# Patient Record
Sex: FEMALE | URBAN - METROPOLITAN AREA
[De-identification: names, ages, dates, MRNs, and addresses within clinical notes are randomized per-mention and may not be internally consistent; named-entity substitution may affect disease eponyms.]

---

## 2021-05-21 ENCOUNTER — APPOINTMENT (RX ONLY)
Dept: URBAN - METROPOLITAN AREA CLINIC 55 | Facility: CLINIC | Age: 31
Setting detail: DERMATOLOGY
End: 2021-05-21

## 2021-05-21 DIAGNOSIS — Z41.9 ENCOUNTER FOR PROCEDURE FOR PURPOSES OTHER THAN REMEDYING HEALTH STATE, UNSPECIFIED: ICD-10-CM

## 2021-05-21 PROCEDURE — ? PALOMAR VECTUS LASER HAIR REMOVAL

## 2021-05-21 PROCEDURE — ? COSMETIC CONSULTATION: GENERAL

## 2021-05-21 NOTE — PROCEDURE: PALOMAR VECTUS LASER HAIR REMOVAL
Post-Care Instructions: I reviewed with the patient in detail post-care instructions. Patient should avoid sun for a minimum of 4 weeks before and after treatment.
External Cooling Fan Speed: 0
Laser Type: Alexandrite 755nm
Fluence In J/Cm2: 10
Optic Size: 23 x 38mm
Render Post-Care In The Note: No
Post-Procedure Care: Immediate endpoint: perifollicular erythema and edema. Post care was reviewed with the patient and all patient questions were answered to their satisfaction.
Rate (Hz): Medium
Pre-Procedure: Prior to proceeding the treatment areas were cleaned and all present put on their eye protection.
Detail Level: Detailed
Consent: Verbal consent obtained, risks reviewed including but not limited to crusting, scabbing, blistering, scarring, darker or lighter pigmentary change, paradoxical hair regrowth, incomplete removal of hair and infection.

## 2021-06-18 ENCOUNTER — APPOINTMENT (RX ONLY)
Dept: URBAN - METROPOLITAN AREA CLINIC 55 | Facility: CLINIC | Age: 31
Setting detail: DERMATOLOGY
End: 2021-06-18

## 2021-06-18 DIAGNOSIS — Z41.9 ENCOUNTER FOR PROCEDURE FOR PURPOSES OTHER THAN REMEDYING HEALTH STATE, UNSPECIFIED: ICD-10-CM

## 2021-06-18 PROCEDURE — ? PALOMAR VECTUS LASER HAIR REMOVAL

## 2021-06-18 NOTE — PROCEDURE: PALOMAR VECTUS LASER HAIR REMOVAL
Detail Level: Detailed
Treatment Number: 0
Render Post-Care In The Note: No
Laser Type: Alexandrite 755nm
Rate (Hz): Medium
Optic Size: 23 x 38mm
Post-Care Instructions: I reviewed with the patient in detail post-care instructions. Patient should avoid sun for a minimum of 4 weeks before and after treatment.
Fluence In J/Cm2: 10
Post-Procedure Care: Immediate endpoint: perifollicular erythema and edema. Post care was reviewed with the patient and all patient questions were answered to their satisfaction.
Pre-Procedure: Prior to proceeding the treatment areas were cleaned and all present put on their eye protection.
Consent: Verbal consent obtained, risks reviewed including but not limited to crusting, scabbing, blistering, scarring, darker or lighter pigmentary change, paradoxical hair regrowth, incomplete removal of hair and infection.

## 2021-06-19 ENCOUNTER — APPOINTMENT (RX ONLY)
Dept: URBAN - METROPOLITAN AREA CLINIC 55 | Facility: CLINIC | Age: 31
Setting detail: DERMATOLOGY
End: 2021-06-19

## 2021-06-22 ENCOUNTER — APPOINTMENT (RX ONLY)
Dept: URBAN - METROPOLITAN AREA CLINIC 55 | Facility: CLINIC | Age: 31
Setting detail: DERMATOLOGY
End: 2021-06-22

## 2021-06-22 DIAGNOSIS — Z41.9 ENCOUNTER FOR PROCEDURE FOR PURPOSES OTHER THAN REMEDYING HEALTH STATE, UNSPECIFIED: ICD-10-CM

## 2021-06-22 PROCEDURE — ? BOTOX

## 2021-06-22 NOTE — PROCEDURE: BOTOX
Show Additional Area 3: Yes
[facility] Units: 0
Lot #: [id number]
Additional Area 3 Location: Chin
Consent: Written consent obtained. Risks include but not limited to lid/brow ptosis, bruising, swelling, diplopia, temporary effect, incomplete chemical denervation.
Show Lcl Units: No
Additional Area 1 Location: Lateral Brows
Dilution (U/0.1 Cc): 4
Expiration Date (Month Year): 10/2023
Additional Area 2 Location: upper lip
Forehead Units: 3
Post-Care Instructions: Patient instructed to not lie down for 4 hours and limit physical activity for 24 hours.
Detail Level: Detailed

## 2022-01-05 ENCOUNTER — APPOINTMENT (RX ONLY)
Dept: URBAN - METROPOLITAN AREA CLINIC 55 | Facility: CLINIC | Age: 32
Setting detail: DERMATOLOGY
End: 2022-01-05

## 2022-01-05 DIAGNOSIS — Z41.9 ENCOUNTER FOR PROCEDURE FOR PURPOSES OTHER THAN REMEDYING HEALTH STATE, UNSPECIFIED: ICD-10-CM

## 2022-01-05 PROCEDURE — ? DIAGNOSIS COMMENT

## 2022-01-05 PROCEDURE — ? BOTOX

## 2022-01-05 NOTE — PROCEDURE: DIAGNOSIS COMMENT
Comment: Patient reports that she had Botox injections done one and a half months ago at [facility]. Patient states that her injector had recommended a higher dose than what she has been receiving here for the past few years. The injector also recommended a different injection pattern. Per the patient, Botox was injected between her brows and under her lateral brow tails. Patient reports excessive heaviness to medial brows; unevenness and brow “drop” can be visualized. Even though heaviness is present, lots of movement can be seen to the bilateral corrugators, which leads this RN to believe the lower aspect of the frontalis was injected with neurotoxins and not the corrugators. Patient is not interested in ever returning to the other location for follow up or augmentation. She would like her normal dosing today, but she is aware the heaviness may take 4-6 more weeks to dissipate; verbalized understanding.
Render Risk Assessment In Note?: no
Detail Level: Simple

## 2022-01-05 NOTE — PROCEDURE: BOTOX
Show Ucl Units: No
Additional Area 4 Units: 0
Post-Care Instructions: Patient instructed to not lie down for 4 hours and limit physical activity for 24 hours.
Show Orbicularis Oculi Units: Yes
Glabellar Complex Units: 4
Expiration Date (Month Year): 3/2024
Additional Area 2 Location: Upper lip
Forehead Units: 3
Additional Area 4 Location: upper cutaneous lip
Detail Level: Detailed
Lot #: [id number]
Consent: Written consent obtained. Risks include but not limited to lid/brow ptosis, bruising, swelling, diplopia, temporary effect, incomplete chemical denervation.
Additional Area 3 Location: Chin
Additional Area 1 Location: Left Lateral Brow

## 2022-07-07 ENCOUNTER — APPOINTMENT (RX ONLY)
Dept: URBAN - METROPOLITAN AREA CLINIC 55 | Facility: CLINIC | Age: 32
Setting detail: DERMATOLOGY
End: 2022-07-07

## 2022-07-07 DIAGNOSIS — Z41.9 ENCOUNTER FOR PROCEDURE FOR PURPOSES OTHER THAN REMEDYING HEALTH STATE, UNSPECIFIED: ICD-10-CM

## 2022-07-07 PROCEDURE — ? BOTOX

## 2022-07-07 NOTE — PROCEDURE: BOTOX
Show Forehead Units: Yes
Additional Area 5 Units: 0
Consent: Written consent obtained. Risks include but not limited to lid/brow ptosis, bruising, swelling, diplopia, temporary effect, incomplete chemical denervation.
Dilution (U/0.1 Cc): 4
Post-Care Instructions: Patient instructed to not lie down for 4 hours and limit physical activity for 24 hours.
Additional Area 3 Location: Chin
Additional Area 1 Location: Right Lateral Brow
Show Right And Left Pupillary Line Units: No
Detail Level: Detailed
Lot #: [id number]
Additional Area 2 Location: Upper lip
Additional Area 5 Location: lower eye lids
Expiration Date (Month Year): 4/2024
Forehead Units: 3
Glabellar Complex Units: 6

## 2022-07-20 ENCOUNTER — APPOINTMENT (RX ONLY)
Dept: URBAN - METROPOLITAN AREA CLINIC 55 | Facility: CLINIC | Age: 32
Setting detail: DERMATOLOGY
End: 2022-07-20

## 2022-07-20 DIAGNOSIS — Z41.9 ENCOUNTER FOR PROCEDURE FOR PURPOSES OTHER THAN REMEDYING HEALTH STATE, UNSPECIFIED: ICD-10-CM

## 2022-07-20 PROCEDURE — ? BOTOX

## 2022-07-20 PROCEDURE — ? DIAGNOSIS COMMENT

## 2022-07-20 NOTE — PROCEDURE: DIAGNOSIS COMMENT
Render Risk Assessment In Note?: no
Detail Level: Simple
Comment: Spocking to right brow, but patient prefers that look. 1 unit of Botox to left lateral brow for slight lift for even resting brows.

## 2022-07-20 NOTE — PROCEDURE: BOTOX
Depressor Anguli Oris Units: 0
Show Additional Area 2: Yes
Additional Area 2 Location: Upper lip
Additional Area 6 Location: Left lateral brow
Expiration Date (Month Year): 4/2024
Lot #: [id number]
Show Right And Left Brow Units: No
Dilution (U/0.1 Cc): 4
Additional Area 3 Location: Chin
Post-Care Instructions: Patient instructed to not lie down for 4 hours and limit physical activity for 24 hours.
Additional Area 5 Location: lower eye lids
Additional Area 6 Units: 1
Additional Area 1 Location: Right Lateral Brow
Consent: Written consent obtained. Risks include but not limited to lid/brow ptosis, bruising, swelling, diplopia, temporary effect, incomplete chemical denervation.
Detail Level: Detailed

## 2022-09-30 ENCOUNTER — APPOINTMENT (RX ONLY)
Dept: URBAN - METROPOLITAN AREA CLINIC 55 | Facility: CLINIC | Age: 32
Setting detail: DERMATOLOGY
End: 2022-09-30

## 2022-09-30 DIAGNOSIS — Z41.9 ENCOUNTER FOR PROCEDURE FOR PURPOSES OTHER THAN REMEDYING HEALTH STATE, UNSPECIFIED: ICD-10-CM

## 2022-09-30 PROCEDURE — ? BOTOX

## 2022-09-30 NOTE — PROCEDURE: BOTOX
Show Mentalis Units: No
Additional Area 1 Location: upper lip
R Brow Units: 0
Show Inventory Tab: Show
Show Additional Area 1: Yes
Forehead Units: 3
Detail Level: Detailed
Post-Care Instructions: Patient instructed to not lie down for 4 hours and limit physical activity for 24 hours.
Consent: Verbal consent obtained. Risks include but not limited to lid/brow ptosis, bruising, swelling, diplopia, temporary effect, incomplete chemical denervation.
Additional Area 2 Location: chin
Dilution (U/0.1 Cc): 4
Glabellar Complex Units: 6

## 2023-01-13 ENCOUNTER — APPOINTMENT (RX ONLY)
Dept: URBAN - METROPOLITAN AREA CLINIC 55 | Facility: CLINIC | Age: 33
Setting detail: DERMATOLOGY
End: 2023-01-13

## 2023-01-13 DIAGNOSIS — Z41.9 ENCOUNTER FOR PROCEDURE FOR PURPOSES OTHER THAN REMEDYING HEALTH STATE, UNSPECIFIED: ICD-10-CM

## 2023-01-13 PROCEDURE — ? BOTOX

## 2023-01-13 NOTE — PROCEDURE: BOTOX
Glabellar Complex Units: 6
R Brow Units: 0
Show Right And Left Brow Units: No
Show Depressor Anguli Units: Yes
Additional Area 2 Location: chin
Show Inventory Tab: Show
Consent: Verbal consent obtained. Risks include but not limited to lid/brow ptosis, bruising, swelling, diplopia, temporary effect, incomplete chemical denervation.
Post-Care Instructions: Patient instructed to not lie down for 4 hours and limit physical activity for 24 hours.
Dilution (U/0.1 Cc): 4
Detail Level: Detailed
Forehead Units: 3
Additional Area 1 Location: upper lip

## 2023-01-19 ENCOUNTER — APPOINTMENT (RX ONLY)
Dept: URBAN - METROPOLITAN AREA CLINIC 55 | Facility: CLINIC | Age: 33
Setting detail: DERMATOLOGY
End: 2023-01-19

## 2023-01-19 DIAGNOSIS — Z41.9 ENCOUNTER FOR PROCEDURE FOR PURPOSES OTHER THAN REMEDYING HEALTH STATE, UNSPECIFIED: ICD-10-CM

## 2023-01-19 PROCEDURE — ? INVENTORY

## 2023-01-19 PROCEDURE — ? HYDRAFACIAL

## 2023-01-19 ASSESSMENT — LOCATION SIMPLE DESCRIPTION DERM: LOCATION SIMPLE: GLABELLA

## 2023-01-19 ASSESSMENT — LOCATION ZONE DERM: LOCATION ZONE: FACE

## 2023-01-19 ASSESSMENT — LOCATION DETAILED DESCRIPTION DERM: LOCATION DETAILED: GLABELLA

## 2023-01-19 NOTE — PROCEDURE: HYDRAFACIAL
Number Of Passes Step 3: 0
Tip Override
Treatment Number: 1
Comments: [facility] [employer]. Blue tip, peel prep,   Vit c booster, 10 min red led
Solution Override
Indication: skin texture
Location: face
Consent: Written consent obtained, risks reviewed including but not limited to crusting, scabbing, blistering, scarring, darker or lighter pigmentary change, bruising, and/or incomplete response.
Post-Care Instructions: I reviewed with the patient in detail post-care instructions. Patient should stay away from the sun and wear sun protection until treated areas are fully healed.

## 2023-01-27 ENCOUNTER — APPOINTMENT (RX ONLY)
Dept: URBAN - METROPOLITAN AREA CLINIC 55 | Facility: CLINIC | Age: 33
Setting detail: DERMATOLOGY
End: 2023-01-27

## 2023-01-27 DIAGNOSIS — Z41.9 ENCOUNTER FOR PROCEDURE FOR PURPOSES OTHER THAN REMEDYING HEALTH STATE, UNSPECIFIED: ICD-10-CM

## 2023-01-27 PROCEDURE — ? BOTOX

## 2023-01-27 PROCEDURE — ? INVENTORY

## 2023-01-27 PROCEDURE — ? DIAGNOSIS COMMENT

## 2023-01-27 PROCEDURE — ? IN-HOUSE DISPENSING PHARMACY

## 2023-01-27 NOTE — PROCEDURE: DIAGNOSIS COMMENT
Render Risk Assessment In Note?: no
Detail Level: Simple
Comment: 2 units at NC to left lateral  for an undulating appearance.

## 2023-01-27 NOTE — PROCEDURE: BOTOX
Nasal Root Units: 0
Show Periorbital Units: Yes
Show Ucl Units: No
Consent: Verbal consent obtained. Risks include but not limited to lid/brow ptosis, bruising, swelling, diplopia, temporary effect, incomplete chemical denervation.
Additional Area 1 Location: upper lip
Incrementing Botox Units: By 0.5 Units
Post-Care Instructions: Patient instructed to not lie down for 4 hours and limit physical activity for 24 hours.
Dilution (U/0.1 Cc): 4
Detail Level: Detailed
Glabellar Complex Units: 2
Show Inventory Tab: Show
Additional Area 2 Location: chin

## 2023-01-27 NOTE — PROCEDURE: IN-HOUSE DISPENSING PHARMACY
Product 52 Unit Type: mg
Product 43 Price/Unit (In Dollars): 0
Name Of Product 1: Anti-Aging Brightening Cream
Product 5 Refills: 11
Product 5 Units Dispensed: 1
Product 3 Unit Type: ml
Product 1 Application Directions: Apply nightly or as directed. Use SPF daily.
Name Of Product 6: Rosacea Triple Combination Gel
Product 7 Amount/Unit (Numbers Only): 30
Render Product Pricing In Note: Yes
Product 7 Application Directions: Apply only as directed
Name Of Product 2: Dapsone / Spironolactone Gel
Product 2 Application Directions: Apply nightly or as directed.
Name Of Product 4: Hydroquinone 8% Emulsion
Name Of Product 7: Lidocaine 23% / Tetracaine 7% Cream
Product 4 Refills: 2
Name Of Product 5: Hydrating Tretinoin 0.025% Cream
Name Of Product 3: Acne Triple Combination Gel
Send Charges To Patient Encounter: No
Detail Level: Zone

## 2023-04-18 ENCOUNTER — APPOINTMENT (RX ONLY)
Dept: URBAN - METROPOLITAN AREA CLINIC 55 | Facility: CLINIC | Age: 33
Setting detail: DERMATOLOGY
End: 2023-04-18

## 2023-04-18 DIAGNOSIS — Z41.9 ENCOUNTER FOR PROCEDURE FOR PURPOSES OTHER THAN REMEDYING HEALTH STATE, UNSPECIFIED: ICD-10-CM

## 2023-04-18 PROCEDURE — ? BOTOX

## 2023-04-18 NOTE — PROCEDURE: BOTOX
Lateral Platysmal Bands Units: 0
Show Topical Anesthesia: Yes
Additional Area 2 Location: chin
Show Mentalis Units: No
Forehead Units: 3
Glabellar Complex Units: 8
Additional Area 1 Location: upper lip
Detail Level: Detailed
Show Inventory Tab: Show
Consent: Verbal consent obtained. Risks include but not limited to lid/brow ptosis, bruising, swelling, diplopia, temporary effect, incomplete chemical denervation.
Dilution (U/0.1 Cc): 4
Incrementing Botox Units: By 0.5 Units
Post-Care Instructions: Patient instructed to not lie down for 4 hours and limit physical activity for 24 hours.

## 2023-05-09 ENCOUNTER — APPOINTMENT (RX ONLY)
Dept: URBAN - METROPOLITAN AREA CLINIC 55 | Facility: CLINIC | Age: 33
Setting detail: DERMATOLOGY
End: 2023-05-09

## 2023-05-09 DIAGNOSIS — Z41.9 ENCOUNTER FOR PROCEDURE FOR PURPOSES OTHER THAN REMEDYING HEALTH STATE, UNSPECIFIED: ICD-10-CM

## 2023-05-09 PROCEDURE — ? DIAGNOSIS COMMENT

## 2023-05-09 PROCEDURE — ? INVENTORY

## 2023-05-09 PROCEDURE — ? BOTOX

## 2023-05-09 NOTE — PROCEDURE: DIAGNOSIS COMMENT
Render Risk Assessment In Note?: no
Detail Level: Simple
Comment: 2 units to right lateral  for unwanted flexion (area not previously treated).

## 2023-05-09 NOTE — PROCEDURE: BOTOX
Additional Area 2 Location: chin
Nasal Root Units: 0
Show Additional Area 6: Yes
Forehead Units: 2
Show Right And Left Pupillary Line Units: No
Show Inventory Tab: Show
Additional Area 1 Location: upper lip
Consent: Verbal consent obtained. Risks include but not limited to lid/brow ptosis, bruising, swelling, diplopia, temporary effect, incomplete chemical denervation.
Dilution (U/0.1 Cc): 4
Post-Care Instructions: Patient instructed to not lie down for 4 hours and limit physical activity for 24 hours.
Additional Area 3 Location: lower lip
Incrementing Botox Units: By 0.5 Units
Detail Level: Detailed

## 2023-08-28 ENCOUNTER — APPOINTMENT (RX ONLY)
Dept: URBAN - METROPOLITAN AREA CLINIC 55 | Facility: CLINIC | Age: 33
Setting detail: DERMATOLOGY
End: 2023-08-28

## 2023-08-28 DIAGNOSIS — Z41.9 ENCOUNTER FOR PROCEDURE FOR PURPOSES OTHER THAN REMEDYING HEALTH STATE, UNSPECIFIED: ICD-10-CM

## 2023-08-28 PROCEDURE — ? IN-HOUSE DISPENSING PHARMACY

## 2023-08-28 PROCEDURE — ? INVENTORY

## 2023-08-28 NOTE — PROCEDURE: IN-HOUSE DISPENSING PHARMACY
Product 7 Units Dispensed: 0
Product 33 Unit Type: mg
Product 2 Amount/Unit (Numbers Only): 30
Product 7 Unit Type: ml
Product 5 Units Dispensed: 1
Product 7 Refills: 2
Name Of Product 4: Hydroquinone 8% Emulsion
Name Of Product 5: Hydrating Tretinoin 0.025% Cream
Name Of Product 7: Lidocaine 23% / Tetracaine 7% Cream
Detail Level: Zone
Name Of Product 1: Anti-Aging Brightening Cream
Product 6 Application Directions: Apply nightly or as directed. Use SPF daily.
Render Product Pricing In Note: Yes
Product 4 Application Directions: Apply nightly or as directed.
Name Of Product 6: Rosacea Triple Combination Gel
Name Of Product 2: Dapsone / Spironolactone Gel
Product 3 Refills: 11
Product 7 Application Directions: Apply only as directed
Product 5 Refills: 10
Send Charges To Patient Encounter: No
Name Of Product 3: Acne Triple Combination Gel

## 2024-04-02 ENCOUNTER — APPOINTMENT (RX ONLY)
Dept: URBAN - METROPOLITAN AREA CLINIC 55 | Facility: CLINIC | Age: 34
Setting detail: DERMATOLOGY
End: 2024-04-02

## 2024-04-02 DIAGNOSIS — Z41.9 ENCOUNTER FOR PROCEDURE FOR PURPOSES OTHER THAN REMEDYING HEALTH STATE, UNSPECIFIED: ICD-10-CM

## 2024-04-02 PROCEDURE — ? BOTOX

## 2024-04-02 NOTE — PROCEDURE: BOTOX
Right Pupillary Line Units: 0
Show [place] Units: Yes
Show Right And Left Pupillary Line Units: No
Additional Area 3 Location: lower lip
Dilution (U/0.1 Cc): 4
Detail Level: Detailed
Additional Area 2 Location: chin
Incrementing Botox Units: By 0.5 Units
Consent: Verbal consent obtained. Risks include but not limited to lid/brow ptosis, bruising, swelling, diplopia, temporary effect, incomplete chemical denervation.
Additional Area 1 Location: upper lip
Show Inventory Tab: Show
Post-Care Instructions: Patient instructed to not lie down for 4 hours and limit physical activity for 24 hours.
Forehead Units: 3
Glabellar Complex Units: 10

## 2024-04-09 ENCOUNTER — APPOINTMENT (RX ONLY)
Dept: URBAN - METROPOLITAN AREA CLINIC 55 | Facility: CLINIC | Age: 34
Setting detail: DERMATOLOGY
End: 2024-04-09

## 2024-04-09 DIAGNOSIS — Z41.9 ENCOUNTER FOR PROCEDURE FOR PURPOSES OTHER THAN REMEDYING HEALTH STATE, UNSPECIFIED: ICD-10-CM

## 2024-04-09 PROCEDURE — ? INVENTORY

## 2024-04-09 PROCEDURE — ? BOTOX

## 2024-04-09 PROCEDURE — ? DIAGNOSIS COMMENT

## 2024-04-09 NOTE — PROCEDURE: BOTOX
Show Additional Area 6: Yes
Show Ucl Units: No
Left Pupillary Line Units: 0
Consent: Verbal consent obtained. Risks include but not limited to lid/brow ptosis, bruising, swelling, diplopia, temporary effect, incomplete chemical denervation.
Dilution (U/0.1 Cc): 4
Post-Care Instructions: Patient instructed to not lie down for 4 hours and limit physical activity for 24 hours.
Additional Area 2 Location: chin
Incrementing Botox Units: By 0.5 Units
Detail Level: Detailed
Additional Area 1 Location: upper lip
Show Inventory Tab: Show
Additional Area 3 Location: lower lip

## 2024-04-09 NOTE — PROCEDURE: DIAGNOSIS COMMENT
Detail Level: Simple
Render Risk Assessment In Note?: no
Comment: Patient reports medial brow heaviness after last Botox injection. 4 units at NC to lateral brow to help lift. Will avoid lateral  injection at next visit.

## 2024-04-17 ENCOUNTER — APPOINTMENT (RX ONLY)
Dept: URBAN - METROPOLITAN AREA CLINIC 55 | Facility: CLINIC | Age: 34
Setting detail: DERMATOLOGY
End: 2024-04-17

## 2024-04-17 DIAGNOSIS — Z41.9 ENCOUNTER FOR PROCEDURE FOR PURPOSES OTHER THAN REMEDYING HEALTH STATE, UNSPECIFIED: ICD-10-CM

## 2024-04-17 PROCEDURE — ? INVENTORY

## 2024-04-17 PROCEDURE — ? IN-HOUSE DISPENSING PHARMACY

## 2024-04-17 NOTE — PROCEDURE: IN-HOUSE DISPENSING PHARMACY
Product 80 Refills: 0
Product 4 Refills: 2
Product 76 Unit Type: mg
Render Product Pricing In Note: Yes
Product 6 Refills: 11
Product 6 Amount/Unit (Numbers Only): 30
Product 2 Unit Type: ml
Product 1 Application Directions: Apply nightly or as directed. Use SPF daily.
Product 7 Application Directions: Apply only as directed
Name Of Product 4: Hydroquinone 8% Emulsion
Name Of Product 5: Hydrating Tretinoin 0.025% Cream
Name Of Product 1: Anti-Aging Brightening Cream
Name Of Product 3: Acne Triple Combination Gel
Name Of Product 2: Dapsone / Spironolactone Gel
Name Of Product 6: Rosacea Triple Combination Gel
Send Charges To Patient Encounter: No
Product 4 Application Directions: Apply nightly or as directed.
Detail Level: Zone
Name Of Product 7: Lidocaine 23% / Tetracaine 7% Cream
Product 5 Units Dispensed: 1

## 2024-12-20 ENCOUNTER — RX ONLY (RX ONLY)
Age: 34
End: 2024-12-20

## 2024-12-20 RX ORDER — TRETINOIN/HYALURONATE/NIACIN 0.025-0.5%
CREAM (GRAM) TOPICAL
Qty: 30 | Refills: 9 | Status: ERX | COMMUNITY
Start: 2024-12-20

## 2025-03-26 ENCOUNTER — APPOINTMENT (OUTPATIENT)
Dept: URBAN - METROPOLITAN AREA CLINIC 55 | Facility: CLINIC | Age: 35
Setting detail: DERMATOLOGY
End: 2025-03-26

## 2025-03-26 DIAGNOSIS — Z41.9 ENCOUNTER FOR PROCEDURE FOR PURPOSES OTHER THAN REMEDYING HEALTH STATE, UNSPECIFIED: ICD-10-CM

## 2025-03-26 PROCEDURE — ? BOTOX

## 2025-03-26 PROCEDURE — ? INVENTORY

## 2025-03-26 NOTE — PROCEDURE: BOTOX
Show Additional Area 3: Yes
Anterior Platysmal Bands Units: 0
Show Mentalis Units: No
Additional Area 3 Location: gummy smile
Glabellar Complex Units: 6
Forehead Units: 3
Show Inventory Tab: Hide
Additional Area 1 Location: Lip
Post-Care Instructions: Patient instructed to not lie down for 4 hours and limit physical activity for 24 hours.
Incrementing Botox Units: By 0.5 Units
Detail Level: Detailed
Additional Area 2 Location: Nasalis
Periorbital Skin Units: 4
Consent obtained. Risks include but not limited to lid/brow ptosis, bruising, swelling, diplopia, temporary effect, incomplete chemical denervation.